# Patient Record
Sex: FEMALE | Race: BLACK OR AFRICAN AMERICAN
[De-identification: names, ages, dates, MRNs, and addresses within clinical notes are randomized per-mention and may not be internally consistent; named-entity substitution may affect disease eponyms.]

---

## 2019-03-20 NOTE — PHYS DOC
Past Medical History


Past Medical History:  Anemia, Arthritis, Bipolar, Depression, Other


Additional Past Medical Histor:  CHRONIC BACK & KNEE PAIN


Past Surgical History:  Other


Additional Past Surgical Histo:  L CARPAL TUNNEL RELEASE


Alcohol Use:  Occasionally


Drug Use:  Phencyclidine





Adult General


Chief Complaint


Chief Complaint:  FLU SYMPTOM





HPI


HPI





Patient is a 43  year old female with history of anemia, arthritis, depression, 

who presents to the ED today with multiple complaints. Patient is complaining 

of body aches, chills, fevers, nausea, vomiting and diarrhea. Symptoms began 3 

days ago. She is actively vomiting in the ED.





Review of Systems


Review of Systems





Constitutional: Reports body aches, chills, fever


Eyes: Denies change in visual acuity, redness, or eye pain []


HENT: Denies nasal congestion or sore throat []


Respiratory: Denies cough or shortness of breath []


Cardiovascular: No additional information not addressed in HPI []


GI: Reports nausea, vomiting, diarrhea. Denies abdominal pain,  bloody stools


: Denies dysuria or hematuria []


Musculoskeletal: Denies back pain or joint pain []


Integument: Denies rash or skin lesions []


Neurologic: Denies headache, focal weakness or sensory changes []








All other systems were reviewed and found to be within normal limits, except as 

documented in this note.





Current Medications


Current Medications





Current Medications








 Medications


  (Trade)  Dose


 Ordered  Sig/Beverly  Start Time


 Stop Time Status Last Admin


Dose Admin


 


 Acetaminophen


  (Tylenol)  1,000 mg  1X  ONCE  3/20/19 15:45


 3/20/19 15:46 DC 3/20/19 16:31


1,000 MG


 


 Ondansetron HCl


  (Zofran)  4 mg  1X  ONCE  3/20/19 15:45


 3/20/19 15:46 DC 3/20/19 16:26


4 MG


 


 Sodium Chloride  1,000 ml @ 


 1,000 mls/hr  1X  ONCE  3/20/19 15:45


 3/20/19 16:44 DC 3/20/19 16:27


1,000 MLS/HR











Allergies


Allergies





Allergies








Coded Allergies Type Severity Reaction Last Updated Verified


 


  Penicillins Allergy Unknown  5/7/16 No











Physical Exam


Physical Exam





Constitutional: Well developed, well nourished, no acute distress, non-toxic 

appearance. []


HENT: Normocephalic, atraumatic, bilateral external ears normal, oropharynx 

moist, no oral exudates, nose normal. []


Eyes: PERRLA, EOMI, conjunctiva normal, no discharge. [] 


Neck: Normal range of motion, no tenderness, supple, no stridor. [] 


Cardiovascular:Heart rate regular rhythm, no murmur []


Lungs & Thorax:  Bilateral breath sounds clear to auscultation []


Abdomen: Patient is actively vomiting in the ED. Bowel sounds normal, soft, no 

tenderness, no masses, no pulsatile masses. [] 


Skin: Warm, dry, no erythema, no rash. [] 


Back: No tenderness, no CVA tenderness. [] 


Extremities: No tenderness, no cyanosis, no clubbing, ROM intact, no edema. [] 


Neurologic: Alert and oriented X 3, normal motor function, normal sensory 

function, no focal deficits noted. []


Psychologic: Flat affect.





Current Patient Data


Vital Signs





 Vital Signs








  Date Time  Temp Pulse Resp B/P (MAP) Pulse Ox O2 Delivery O2 Flow Rate FiO2


 


3/20/19 14:46 98.1 82 16 96/56 (69) 96 Room Air  





 98.1       








Lab Values





 Laboratory Tests








Test


 3/20/19


14:56 3/20/19


15:45 3/20/19


15:48 3/20/19


16:25


 


Influenza Type A Antigen


 Negative


(NEGATIVE) 


 


 





 


Influenza Type B Antigen


 Negative


(NEGATIVE) 


 


 





 


Urine Collection Type  Unknown    


 


Urine Color  Yellow    


 


Urine Clarity  Clear    


 


Urine pH  6.0    


 


Urine Specific Gravity  >=1.030    


 


Urine Protein


 


 30 mg/dL


(NEG-TRACE) 


 





 


Urine Glucose (UA)


 


 Negative mg/dL


(NEG) 


 





 


Urine Ketones (Stick)


 


 Trace mg/dL


(NEG) 


 





 


Urine Blood


 


 Negative (NEG)


 


 





 


Urine Nitrite


 


 Negative (NEG)


 


 





 


Urine Bilirubin  Small (NEG)    


 


Urine Urobilinogen Dipstick


 


 1.0 mg/dL (0.2


mg/dL) 


 





 


Urine Leukocyte Esterase


 


 Negative (NEG)


 


 





 


Urine RBC


 


 3-5 /HPF (0-2)


 


 





 


Urine WBC  0 /HPF (0-4)    


 


Urine Squamous Epithelial


Cells 


 Many /LPF  


 


 





 


Urine Bacteria


 


 Few /HPF


(0-FEW) 


 





 


Urine Mucus  Marked /LPF    


 


Urine Yeast  Present /HPF    


 


POC Urine HCG, Qualitative


 


 


 Hcg negative


(Negative) 





 


White Blood Count


 


 


 


 3.1 x10^3/uL


(4.0-11.0)  L


 


Red Blood Count


 


 


 


 4.71 x10^6/uL


(3.50-5.40)


 


Hemoglobin


 


 


 


 12.8 g/dL


(12.0-15.5)


 


Hematocrit


 


 


 


 39.6 %


(36.0-47.0)


 


Mean Corpuscular Volume


 


 


 


 84 fL ()





 


Mean Corpuscular Hemoglobin    27 pg (25-35)  


 


Mean Corpuscular Hemoglobin


Concent 


 


 


 32 g/dL


(31-37)


 


Red Cell Distribution Width


 


 


 


 16.0 %


(11.5-14.5)  H


 


Platelet Count


 


 


 


 191 x10^3/uL


(140-400)


 


Neutrophils (%) (Auto)    53 % (31-73)  


 


Lymphocytes (%) (Auto)    38 % (24-48)  


 


Monocytes (%) (Auto)    7 % (0-9)  


 


Eosinophils (%) (Auto)    2 % (0-3)  


 


Basophils (%) (Auto)    1 % (0-3)  


 


Neutrophils # (Auto)


 


 


 


 1.6 x10^3uL


(1.8-7.7)  L


 


Lymphocytes # (Auto)


 


 


 


 1.2 x10^3/uL


(1.0-4.8)


 


Monocytes # (Auto)


 


 


 


 0.2 x10^3/uL


(0.0-1.1)


 


Eosinophils # (Auto)


 


 


 


 0.1 x10^3/uL


(0.0-0.7)


 


Basophils # (Auto)


 


 


 


 0.0 x10^3/uL


(0.0-0.2)


 


Sodium Level


 


 


 


 137 mmol/L


(136-145)


 


Potassium Level


 


 


 


 3.7 mmol/L


(3.5-5.1)


 


Chloride Level


 


 


 


 101 mmol/L


()


 


Carbon Dioxide Level


 


 


 


 19 mmol/L


(21-32)  L


 


Anion Gap    17 (6-14)  H


 


Blood Urea Nitrogen


 


 


 


 11 mg/dL


(7-20)


 


Creatinine


 


 


 


 1.0 mg/dL


(0.6-1.0)


 


Estimated GFR


(Cockcroft-Gault) 


 


 


 73.9  





 


BUN/Creatinine Ratio    11 (6-20)  


 


Glucose Level


 


 


 


 88 mg/dL


(70-99)


 


Calcium Level


 


 


 


 8.5 mg/dL


(8.5-10.1)


 


Total Bilirubin


 


 


 


 0.2 mg/dL


(0.2-1.0)


 


Aspartate Amino Transferase


(AST) 


 


 


 43 U/L (15-37)


H


 


Alanine Aminotransferase (ALT)


 


 


 


 27 U/L (14-59)





 


Alkaline Phosphatase


 


 


 


 87 U/L


()


 


Total Protein


 


 


 


 7.7 g/dL


(6.4-8.2)


 


Albumin


 


 


 


 3.7 g/dL


(3.4-5.0)


 


Albumin/Globulin Ratio


 


 


 


 0.9 (1.0-1.7)


L


 


Lipase


 


 


 


 103 U/L


()





 Laboratory Tests


3/20/19 16:25








 Laboratory Tests


3/20/19 16:25











EKG


EKG


[]





Radiology/Procedures


Radiology/Procedures


[]





Course & Med Decision Making


Course & Med Decision Making


Pertinent Labs and Imaging studies reviewed. (See chart for details)





This is a 43-year-old female patient presenting to the ED today with complaints 

of fever, body aches, chills, nausea, vomiting, diarrhea, symptoms began 3 days 

ago. She is actively vomiting in the ED. CBC with a WBC of 3.1, CMP with AST of 

43, patient has no abdominal pain. No tenderness on exam. Negative for 

influenza A or B. Chest x-ray is negative, urine analysis is negative for 

infection. Urine noted for yeast discharged fluconazole. Patient was given a 

liter of fluid, Zofran. She started asking for prescription cough syrup for 

home use. She states over-the-counter medications are not working for her 

cough. Offered her Tessalon Perles or Delsym. She is refusing, she states she 

wants prescription strength cough medications. Informed patient we will not 

write her prescription strength cough medications because over-the-counter 

medications are  sufficient for her symptoms. Provided the PCP list for follow-

up. Discharged with Zofran as well. Instructed to push fluids.





Dragon Disclaimer


Dragon Disclaimer


This electronic medical record was generated, in whole or in part, using a 

voice recognition dictation system.





Departure


Departure


Impression:  


 Primary Impression:  


 Nausea and vomiting


 Additional Impressions:  


 Diarrhea


 Yeast infection


 Cough


Disposition:  01 HOME, SELF-CARE


Condition:  STABLE


Referrals:  


NO PCP (PCP)


follow up in one week


Patient Instructions:  Cough, Adult, Easy-to-Read, Diarrhea, Easy-to-Read, 

Nausea and Vomiting





Additional Instructions:  


You were evaluated in the medicines were noted to have a viral illness. You 

also have yeast in your urine, take the one tablet a fluconazole as ordered. 

Please rest, push fluids. Take over-the-counter medications for cold symptoms. 

Follow-up with your own doctor or doctor from the list provided in 1-2 weeks.


Scripts


Benzonatate (TESSALON PERLE) 100 Mg Capsule


1 CAP PO TID, #30 CAP


   Prov: MUTUNGA,KAMILA MARITZA         3/20/19 


Ondansetron (ONDANSETRON ODT) 4 Mg Tab.rapdis


1 TAB PO PRN Q6-8HRS, #16 TAB


   Prov: KAMILA LEES MARITZA         3/20/19 


Fluconazole (DIFLUCAN) 150 Mg Tablet


1 TAB PO ONCE, #1 TAB 0 Refills


   Prov: ASHLEYKAMILA COBB MARITZA         3/20/19





Problem Qualifiers








 Primary Impression:  


 Nausea and vomiting


 Vomiting type:  unspecified  Vomiting Intractability:  unspecified  Qualified 

Codes:  R11.2 - Nausea with vomiting, unspecified


 Additional Impressions:  


 Diarrhea


 Diarrhea type:  unspecified type  Qualified Codes:  R19.7 - Diarrhea, 

unspecified








KAMILA LEES MARITZA Mar 20, 2019 15:44

## 2019-03-20 NOTE — RAD
AP and Lateral Views of the Chest  3/20/2019 3:37 PM

 

Indication: PT STATES HAVING CHEST PAIN, CONGESTION, FEVFER, COUGH

 

Comparison: 2 views of the chest November 2, 2010

 

Findings: There is no focal consolidation or infiltrate identified. The 

cardiomediastinal silhouette is within normal limits. There is no evidence

of pneumothorax or pleural effusion. No acute osseous abnormalities are 

identified.  

 

Impression: No evidence of acute cardiopulmonary process. 

 

Electronically signed by: Thom Ortega MD (3/20/2019 4:11 PM) [id number]

## 2019-11-05 NOTE — RAD
Two-view chest dated 11/5/2019.

 

Comparison made to 3/20/2019.

 

CLINICAL INDICATION: Cough.

 

FINDINGS:

 

PA and lateral views obtained. Heart and mediastinal contours within 

normal limits. Lungs are hypoinflated but otherwise clear. No 

consolidation or pleural effusion. No pneumothorax.

 

IMPRESSION:

 

No acute radiographic abnormality.

 

Electronically signed by: Kieran Barnes MD (11/5/2019 9:18 PM) 

[facility]

## 2019-11-05 NOTE — PHYS DOC
Past Medical History


Past Medical History:  Anemia, Arthritis, Bipolar, Depression, Other


Additional Past Medical Histor:  CHRONIC BACK & KNEE PAIN


 (KAMILA LEES)


Past Surgical History:  Other


Additional Past Surgical Histo:  L CARPAL TUNNEL RELEASE


 (KAMILA LEES)


Alcohol Use:  Occasionally


Drug Use:  Phencyclidine


 (KAMILA LEES)


Attending Signature


I have participated in the care of this patient and I have reviewed and agree 

with all pertinent clinical information above including history, exam, and 

recommendations.





 (HERNANDEZ CARBAJAL MD)





Adult General


Chief Complaint


Chief Complaint:  COUGH





HPI


HPI





Patient is a 43  year old female who presents with female with a history of PCP 

use, smoking addiction, bipolar, anxiety, who presents to the ED today 

complaining of a cough for a couple days as well as PCP relapse. Patient states 

she had stopped using PCP but relapsed a couple days ago and would like to go to

rehabilitation. Denies any fever, denies any chest pain or shortness of breath.


 (KAMILA LEES)





Review of Systems


Review of Systems





Constitutional: Denies fever or chills []


Eyes: Denies change in visual acuity, redness, or eye pain []


HENT: Denies nasal congestion or sore throat []


Respiratory: Reports cough, denies shortness of breath []


Cardiovascular: No additional information not addressed in HPI []


GI: Denies abdominal pain, nausea, vomiting, bloody stools or diarrhea []


: Denies dysuria or hematuria []


Musculoskeletal: Denies back pain or joint pain []


Integument: Denies rash or skin lesions []


Neurologic: Denies headache, focal weakness or sensory changes []


Psych: Reports PCP relapse





All other systems were reviewed and found to be within normal limits, except as 

documented in this note.


 (KAMILA LEES)





Allergies


Allergies





Allergies








Coded Allergies Type Severity Reaction Last Updated Verified


 


  Penicillins Allergy Unknown  5/7/16 No





 (HERNANDEZ CARBAJAL MD)





Physical Exam


Physical Exam





Constitutional: Well developed, well nourished, no acute distress, non-toxic 

appearance. []


HENT: Normocephalic, atraumatic, bilateral external ears normal, oropharynx 

moist, no oral exudates, nose normal. []


Eyes: PERRLA, EOMI, conjunctiva normal, no discharge. [] 


Neck: Normal range of motion, no tenderness, supple, no stridor. [] 


Cardiovascular:Heart rate regular rhythm, no murmur []


Lungs & Thorax:  Bilateral breath sounds clear to auscultation []


Abdomen: Bowel sounds normal, soft, no tenderness, no masses, no pulsatile 

masses. [] 


Skin: Warm, dry, no erythema, no rash. [] 


Back: No tenderness, no CVA tenderness. [] 


Extremities: No tenderness, no cyanosis, no clubbing, ROM intact, no edema. [] 


Neurologic: Alert and oriented X 3, normal motor function, normal sensory 

function, no focal deficits noted. Cranial nerves II through XII intact


Psychologic: Affect normal, judgement normal, mood normal. []


 (KAMILA LEES)





Current Patient Data


Vital Signs





                                   Vital Signs








  Date Time  Temp Pulse Resp B/P (MAP) Pulse Ox O2 Delivery O2 Flow Rate FiO2


 


11/5/19 20:10 97.8 81 16 131/84 (100) 98 Room Air  





 97.8       





 (HERNANDEZ CARBAJAL MD)





EKG


EKG


[]


 (KAMILA LEES)





Radiology/Procedures


Radiology/Procedures


[]PROCEDURE: CHEST PA & LATERAL





Two-view chest dated 11/5/2019.


 


Comparison made to 3/20/2019.


 


CLINICAL INDICATION: Cough.


 


FINDINGS:


 


PA and lateral views obtained. Heart and mediastinal contours within 


normal limits. Lungs are hypoinflated but otherwise clear. No 


consolidation or pleural effusion. No pneumothorax.


 


IMPRESSION:


 


No acute radiographic abnormality.


 


Electronically signed by: Kieran Barnes MD (11/5/2019 9:18 PM) 


[facility]














DICTATED and SIGNED BY:     KIERAN BARNES MD


DATE:     11/05/19 2118


 (KAMILA LEES)





Course & Med Decision Making


Course & Med Decision Making


Pertinent Labs and Imaging studies reviewed. (See chart for details)





This is a 43-year-old female patient presenting to the ED today with a cough for

 couple days, chest x-ray is negative.





Patient is also requesting to go to rehabilitation after PCP relapse





Rehan from the parts team talked to patient. He arranged for her to go to rehab 

through [facility] tomorrow. D/c to home








 (KAMILA LEES)





Dragon Disclaimer


Dragon Disclaimer


This electronic medical record was generated, in whole or in part, using a voice

 recognition dictation system.


 (KAMILA LEES)





Departure


Departure


Impression:  


   Primary Impression:  


   PCP (phencyclidine) abuse


   Additional Impression:  


   Cough


Disposition:  01 HOME, SELF-CARE


Condition:  STABLE


Referrals:  


UNKNOWN PCP NAME (PCP)


follow up with Jack tomorrow for rehab


Patient Instructions:  Cough, Adult, Easy-to-Read, Drug Abuse and Addiction-

[facility]





Additional Instructions:  


You were evaluated in the emergency room, your chest x-ray is negative for any 

acute findings. Take the prescribed medications as needed for your cough. Please

 follow-up with Jack for PCP use.


Scripts


Dicyclomine Hcl (DICYCLOMINE HCL) 20 Mg Tablet


1 TAB PO TID, #30 TAB 1 Refill


   Prov: KAMILA LEES         11/5/19 


Benzonatate (TESSALON PERLE) 100 Mg Capsule


1 CAP PO TID, #21 CAP


   Prov: KAMILA LEES         11/5/19 


Ondansetron (ONDANSETRON ODT) 4 Mg Tab.rapdis


1 TAB PO PRN Q6-8HRS, #16 TAB


   Prov: KAMILA LEES         11/5/19





Problem Qualifiers











KAMILA LEES               Nov 5, 2019 22:17


HERNANDEZ CARBAJAL MD               Nov 6, 2019 00:34

## 2020-06-18 NOTE — PHYS DOC
Past Medical History


Past Medical History:  Anemia, Arthritis, Bipolar, Depression, Other


Additional Past Medical Histor:  CHRONIC BACK & KNEE PAIN


Past Surgical History:  Other


Additional Past Surgical Histo:  L CARPAL TUNNEL RELEASE


Smoking Status:  Current Every Day Smoker


Alcohol Use:  Occasionally


Drug Use:  Phencyclidine


Social History Narrative:  PCP USE





General Adult


EDM:


Chief Complaint:  SEXUALLY TRANSMITTED DISEASE





HPI:


HPI:





Patient is a 44  year old female who presents for evaluation of vaginal 

discharge and discomfort that is been progressing in the past 3 to 4 days.  Last

menstrual period was about 3 weeks ago.  Patient is a  4 para 4.  Patient

is nontoxic-appearing and patient is concerned about exposure to STD





Review of Systems:


Review of Systems:


Constitutional:  Denies fever or chills. []


Eyes:  Denies change in visual acuity. []


HENT:  Denies nasal congestion or sore throat. [] 


Respiratory:  Denies cough or shortness of breath. [] 


Cardiovascular:  Denies chest pain or edema. [] 


GI:  Denies abdominal pain, nausea, vomiting, bloody stools or diarrhea. [] 


:  Denies dysuria, vaginal discharge. [] 


Musculoskeletal:  Denies back pain or joint pain. [] 


Integument:  Denies rash. [] 


Neurologic:  Denies headache, focal weakness or sensory changes. [] 


Endocrine:  Denies polyuria or polydipsia. [] 


Lymphatic:  Denies swollen glands. [] 


Psychiatric:  Denies depression or anxiety. []





Heart Score:


Risk Factors:


Risk Factors:  DM, Current or recent (<one month) smoker, HTN, HLP, family 

history of CAD, obesity.


Risk Scores:


Score 0 - 3:  2.5% MACE over next 6 weeks - Discharge Home


Score 4 - 6:  20.3% MACE over next 6 weeks - Admit for Clinical Observation


Score 7 - 10:  72.7% MACE over next 6 weeks - Early Invasive Strategies





Current Medications:





Current Medications








 Medications


  (Trade)  Dose


 Ordered  Sig/Beverly  Start Time


 Stop Time Status Last Admin


Dose Admin


 


 Azithromycin


  (Zithromax)  1,000 mg  1X  ONCE  20 23:30


 20 23:31 DC 20 23:23


1,000 MG


 


 Ceftriaxone Sodium


  (Rocephin Im)  250 mg  1X  ONCE  20 23:30


 20 23:31 DC 6/18/20 23:23


250 MG











Allergies:


Allergies:





Allergies








Coded Allergies Type Severity Reaction Last Updated Verified


 


  Penicillins Allergy Intermediate  20 No











Physical Exam:


PE:





Constitutional: Well developed, well nourished, mild acute distress, non-toxic 

appearance. []


HENT: Normocephalic, atraumatic, bilateral external ears normal, oropharynx 

moist, no oral exudates, nose normal. []


Eyes: PERRL, EOMI, conjunctiva normal, no discharge. [] 


Neck: Normal range of motion, no tenderness, supple. [] 


Cardiovascular:Heart rate regular rhythm, no murmur []


Lungs & Thorax:  Bilateral breath sounds clear to auscultation []


Abdomen: Bowel sounds normal, soft, no tenderness. [] 


Skin: Warm, dry, no erythema, no rash. [] 


Back: No tenderness, no CVA tenderness. [] 


Extremities: No tenderness, no cyanosis, ROM intact, no edema. [] 


Neurologic: Alert and oriented X 3, normal motor function, normal sensory 

function, no focal deficits noted. []


Psychologic: Affect normal, judgement normal, mood normal. 


: Nursing chaperone present, swabs obtained, scant discharge present, no 

evidence of PID, no significant uterine or adnexal tenderness, no bleeding []





Current Patient Data:


Labs:





                                Laboratory Tests








Test


 20


21:24 20


22:10 20


22:25


 


Urine Collection Type Unknown    


 


Urine Color Straw    


 


Urine Clarity Clear    


 


Urine pH


 6.5 (<5.0-8.0)


 


 





 


Urine Specific Gravity


 <=1.005


(1.000-1.030) 


 





 


Urine Protein


 Negative mg/dL


(NEG-TRACE) 


 





 


Urine Glucose (UA)


 Negative mg/dL


(NEG) 


 





 


Urine Ketones (Stick)


 Negative mg/dL


(NEG) 


 





 


Urine Blood


 Negative (NEG)


 


 





 


Urine Nitrite


 Negative (NEG)


 


 





 


Urine Bilirubin


 Negative (NEG)


 


 





 


Urine Urobilinogen Dipstick


 0.2 mg/dL (0.2


mg/dL) 


 





 


Urine Leukocyte Esterase


 Negative (NEG)


 


 





 


Urine RBC 0 /HPF (0-2)    


 


Urine WBC 0 /HPF (0-4)    


 


Urine Squamous Epithelial


Cells Mod /LPF  


 


 





 


Urine Bacteria


 Mod /HPF


(0-FEW) 


 





 


White Blood Count


 


 9.1 x10^3/uL


(4.0-11.0) 





 


Red Blood Count


 


 4.07 x10^6/uL


(3.50-5.40) 





 


Hemoglobin


 


 11.0 g/dL


(12.0-15.5)  L 





 


Hematocrit


 


 32.8 %


(36.0-47.0)  L 





 


Mean Corpuscular Volume


 


 81 fL ()


 





 


Mean Corpuscular Hemoglobin  27 pg (25-35)   


 


Mean Corpuscular Hemoglobin


Concent 


 34 g/dL


(31-37) 





 


Red Cell Distribution Width


 


 17.0 %


(11.5-14.5)  H 





 


Platelet Count


 


 302 x10^3/uL


(140-400) 





 


Neutrophils (%) (Auto)  50 % (31-73)   


 


Lymphocytes (%) (Auto)  38 % (24-48)   


 


Monocytes (%) (Auto)  7 % (0-9)   


 


Eosinophils (%) (Auto)  4 % (0-3)  H 


 


Basophils (%) (Auto)  1 % (0-3)   


 


Neutrophils # (Auto)


 


 4.6 x10^3/uL


(1.8-7.7) 





 


Lymphocytes # (Auto)


 


 3.5 x10^3/uL


(1.0-4.8) 





 


Monocytes # (Auto)


 


 0.6 x10^3/uL


(0.0-1.1) 





 


Eosinophils # (Auto)


 


 0.3 x10^3/uL


(0.0-0.7) 





 


Basophils # (Auto)


 


 0.1 x10^3/uL


(0.0-0.2) 





 


Sodium Level


 


 139 mmol/L


(136-145) 





 


Potassium Level


 


 4.0 mmol/L


(3.5-5.1) 





 


Chloride Level


 


 102 mmol/L


() 





 


Carbon Dioxide Level


 


 25 mmol/L


(21-32) 





 


Anion Gap  12 (6-14)   


 


Blood Urea Nitrogen


 


 11 mg/dL


(7-20) 





 


Creatinine


 


 1.0 mg/dL


(0.6-1.0) 





 


Estimated GFR


(Cockcroft-Gault) 


 73.6  


 





 


BUN/Creatinine Ratio  11 (6-20)   


 


Glucose Level


 


 90 mg/dL


(70-99) 





 


Calcium Level


 


 9.0 mg/dL


(8.5-10.1) 





 


Total Bilirubin


 


 0.2 mg/dL


(0.2-1.0) 





 


Aspartate Amino Transferase


(AST) 


 22 U/L (15-37)


 





 


Alanine Aminotransferase (ALT)


 


 29 U/L (14-59)


 





 


Alkaline Phosphatase


 


 105 U/L


() 





 


Total Protein


 


 7.4 g/dL


(6.4-8.2) 





 


Albumin


 


 3.6 g/dL


(3.4-5.0) 





 


Albumin/Globulin Ratio


 


 0.9 (1.0-1.7)


L 





 


POC Urine HCG, Qualitative


 


 


 Hcg negative


(Negative)





                                Laboratory Tests


20 22:10








                                Laboratory Tests


20 22:10











Microbiology


20 Wet Prep - Final, Complete


Vital Signs:





                                   Vital Signs








  Date Time  Temp Pulse Resp B/P (MAP) Pulse Ox O2 Delivery O2 Flow Rate FiO2


 


20 21:37 96.5 104 14 132/88 (103) 99 Room Air  





 96.5       











EKG:


EKG:


[]





Radiology/Procedures:


Radiology/Procedures:


[]





Course & Med Decision Making:


Course & Med Decision Making


Pertinent Labs and Imaging studies reviewed. (See chart for details)





[]





Dragon Disclaimer:


Dragon Disclaimer:


This electronic medical record was generated, in whole or in part, using a voice

 recognition dictation system.





2355 stable, urinalysis and blood work unremarkable.  Patient treated for 

vaginitis with Rocephin 250 mg IM and Zithromax 1 g p.o.  Close follow-up with 

her family doctor recommended.  Nothing to suggest PID at this time





Departure


Departure


Impression:  


   Primary Impression:  


   Vaginitis


Disposition:  01 HOME, SELF-CARE


Condition:  STABLE


Referrals:  


NO PCP (PCP)








MARELY VILLANUEVA MD


Patient Instructions:  Vaginitis, Easy-to-Read





Additional Instructions:  


Pelvic rest, no intercourse until symptoms improve over several days.  Call and 

see your doctor right away in follow-up, your blood work and urinalysis were 

essentially normal





Justicifation of Admission Dx:


Justifications for Admission:


Justification of Admission Dx:  N/A











YENY RODRIGUEZ DO              2020 23:53

## 2020-08-11 NOTE — PHYS DOC
Past Medical History


Past Medical History:  Anemia, Arthritis, Bipolar, Depression, Other


Additional Past Medical Histor:  CHRONIC BACK & KNEE PAIN


Past Surgical History:  Other


Additional Past Surgical Histo:  L CARPAL TUNNEL RELEASE


Smoking Status:  Current Every Day Smoker


Alcohol Use:  Occasionally


Drug Use:  Phencyclidine





General Adult


EDM:


Chief Complaint:  BACK PAIN OR INJURY





HPI:


HPI:





Patient is a 44  year old female who presents with chronic back pain and knee 

pain.  She has bulging disks in her lower back she states.  She states for the 

last month or so it is gotten worse and she is having bilateral low back pain 

that sharp and shooting and goes down her legs.  The times will cause tingling 

in her feet.  She states she had a doctor's appointment with her primary care 

yesterday but she missed that and states she did call them and told him why she 

was missing it.  She states that she took a couple of pills from her neighbor of

which she does not know what they are yesterday to see if that would help her 

pain.  I did educate the patient that she should not take other peoples 

medications that are not prescribed to her.  Patient became very upset and 

states "well im in pain and it im in pain im going to take whatever i can to 

help that pain".  She states that she does have a another doctor's appointment 

scheduled for the 28th.  She states when she stands up for long periods of time 

her whole back will get tense and start to cramp.  She states she has been 

taking 600 mg of ibuprofen at a time and taking Tylenol it is not helping.  She 

states that she also tries hot baths to help.  Patient rates her sharp shooting 

radiating pain a 10 out of 10.





Review of Systems:


Review of Systems:


Constitutional:   Denies fever or chills. []


Eyes:   Denies change in visual acuity. []


HENT:   Denies nasal congestion or sore throat. [] 


Respiratory:   Denies cough or shortness of breath. [] 


Cardiovascular:   Denies chest pain or edema. [] 


GI:   Denies abdominal pain, nausea, vomiting, bloody stools or diarrhea. [] 


:  Denies dysuria. [] 


Musculoskeletal: Bilateral low back pain that is sharp and shooting down the 

back of her legs bilaterally or joint pain. [] 


Integument:   Denies rash. [] 


Neurologic:   Denies headache, focal weakness or sensory changes. [] 


Endocrine:   Denies polyuria or polydipsia. [] 


Lymphatic:  Denies swollen glands. [] 


Psychiatric:  Denies depression or anxiety. []





Heart Score:


Risk Factors:


Risk Factors:  DM, Current or recent (<one month) smoker, HTN, HLP, family 

history of CAD, obesity.


Risk Scores:


Score 0 - 3:  2.5% MACE over next 6 weeks - Discharge Home


Score 4 - 6:  20.3% MACE over next 6 weeks - Admit for Clinical Observation


Score 7 - 10:  72.7% MACE over next 6 weeks - Early Invasive Strategies





Allergies:


Allergies:





Allergies








Coded Allergies Type Severity Reaction Last Updated Verified


 


  Penicillins Allergy Intermediate  6/18/20 No











Physical Exam:


PE:





Constitutional: Well developed, well nourished, no acute distress, non-toxic 

appearance. []


HENT: Normocephalic, atraumatic, bilateral external ears normal, oropharynx 

moist, no oral exudates, nose normal. []


Eyes: PERRLA, EOMI, conjunctiva normal, no discharge. [] 


Neck: Normal range of motion, no tenderness, supple, no stridor. [] 


Cardiovascular:Heart rate regular rhythm, no murmur []


Lungs & Thorax:  Bilateral breath sounds clear to auscultation []


Abdomen: Bowel sounds normal, soft, no tenderness, no masses, no pulsatile 

masses. [] 


Skin: Warm, dry, no erythema, no rash. [] 


Back: Lateral low back tenderness, no CVA tenderness. [] 


Extremities: No tenderness, no cyanosis, no clubbing, ROM intact, no edema. [] 


Neurologic: Alert and oriented X 3, normal motor function, normal sensory 

function, no focal deficits noted. []


Psychologic: Affect normal, judgement normal, mood normal. []





Current Patient Data:


Vital Signs:





                                   Vital Signs








  Date Time  Temp Pulse Resp B/P (MAP) Pulse Ox O2 Delivery O2 Flow Rate FiO2


 


8/11/20 14:17 98.2 103 20 135/61 (85) 98 Room Air  





 98.2       











EKG:


EKG:


[]





Radiology/Procedures:


Radiology/Procedures:


[]





Course & Med Decision Making:


Course & Med Decision Making


Pertinent Labs and Imaging studies reviewed. (See chart for details)





Alert and oriented x4.  Ambulatory with a steady gait.  No unilateral extremity 

swelling.  Patient has no focal deficits and moves all extremities with normal 

strength and full range of motion's.  Bilateral lower back tenderness with 

palpation.  Denies any urinary symptoms, chest pain, shortness of air, 

dizziness, headache, abdominal pain, nausea, vomiting, diarrhea, fever, vision 

changes, loss of bowel or bladder.  No saddle paresthesias.  Patient states 

sitting and standing and walking make the pain worse.  She states when she 

sitting still the pain is tolerable.  Skin pink warm and dry.  Patient is 

discharged home and to follow-up with her primary care physician as scheduled.





[]





Dragon Disclaimer:


Dragon Disclaimer:


This electronic medical record was generated, in whole or in part, using a voice

 recognition dictation system.





Departure


Departure


Impression:  


   Primary Impression:  


   Chronic low back pain with bilateral sciatica


   Qualified Codes:  M54.42 - Lumbago with sciatica, left side; M54.41 - Lumbago

    with sciatica, right side; G89.29 - Other chronic pain


Disposition:  01 HOME, SELF-CARE


Condition:  STABLE


Referrals:  


UNKNOWN PCP NAME (PCP)


Patient Instructions:  Sciatica with [facility]





Additional Instructions:  


Take medication as prescribed and with food.  Follow-up with your doctor as 

scheduled.  Number these medications will make you sleepy so do not drive or 

drink alcohol with these medications.


Scripts


Orphenadrine Citrate (ORPHENADRINE CITRATE) 100 Mg Tablet.er


1 TAB PO BID, #14 TAB


   Prov: ONEIDA TOMLINSON APRN 8/11/20 


Hydrocodone/Apap 5-325 (NORCO 5-325 TABLET) 1 Each Tablet


1 TAB PO PRN Q6HRS PRN for PAIN, #10 TAB 0 Refills


   Prov: ONEIDA TOMLINSON         8/11/20 


Methylprednisolone (MEDROL) 4 Mg Tab.ds.pk


1 PKG PO UD, #1 PKG


   Prov: ONEIDA TOMLINSON         8/11/20





Justicifation of Admission Dx:


Justifications for Admission:


Justification of Admission Dx:  N/A











ONEIDA TOMLINSON            Aug 11, 2020 14:44

## 2020-10-03 NOTE — PHYS DOC
Past Medical History


Past Medical History:  Anemia, Arthritis, Bipolar, Depression, Other


Additional Past Medical Histor:  CHRONIC BACK & KNEE PAIN


Past Surgical History:  Other


Additional Past Surgical Histo:  L CARPAL TUNNEL RELEASE


Smoking Status:  Current Every Day Smoker


Additional Information:  


1/2 ppd


Alcohol Use:  Occasionally


Drug Use:  Phencyclidine





General Adult


EDM:


Chief Complaint:  SEXUALLY TRANSMITTED DISEASE





HPI:


HPI:





Patient is a 44-year-old female who presents with a chief complaint of vaginal 

discharge over the last 2 to 3 days.  Patient has some itching malodorous 

discharge as well as some lower abdominal pain.  Patient has any fevers vomiting

or diarrhea.  Patient also complains of chronic diffuse pain especially in her 

back.  Patient asking for pain medicines before she can get into see a pain 

management doctor.  Patient admits to unprotected sexual intercourse





Review of Systems:


Review of Systems:


Constitutional:   Denies fever or chills. []


Eyes:   Denies change in visual acuity. []


HENT:   Denies nasal congestion or sore throat. [] 


Respiratory:   Denies cough or shortness of breath. [] 


Cardiovascular:   Denies chest pain or edema. [] 


GI: Complains of abdominal pain


: Complains of vaginal discharge


Musculoskeletal:   Denies back pain or joint pain. [] 


Integument:   Denies rash. [] 


Neurologic:   Denies headache, focal weakness or sensory changes. [] 


Endocrine:   Denies polyuria or polydipsia. [] 


Lymphatic:  Denies swollen glands. [] 


Psychiatric:  Denies depression or anxiety. []





Heart Score:


Risk Factors:


Risk Factors:  DM, Current or recent (<one month) smoker, HTN, HLP, family 

history of CAD, obesity.


Risk Scores:


Score 0 - 3:  2.5% MACE over next 6 weeks - Discharge Home


Score 4 - 6:  20.3% MACE over next 6 weeks - Admit for Clinical Observation


Score 7 - 10:  72.7% MACE over next 6 weeks - Early Invasive Strategies





Current Medications:





Current Medications








 Medications


  (Trade)  Dose


 Ordered  Sig/Beverly  Start Time


 Stop Time Status Last Admin


Dose Admin


 


 Azithromycin


  (Zithromax)  1,000 mg  1X  ONCE  10/3/20 02:30


 10/3/20 02:31 UNV  





 


 Ceftriaxone Sodium


  (Rocephin Im)  250 mg  1X  ONCE  10/3/20 02:30


 10/3/20 02:31 UNV  





 


 Ondansetron HCl


  (Zofran Odt)  4 mg  1X  ONCE  10/3/20 02:30


 10/3/20 02:31 UNV  














Allergies:


Allergies:





Allergies








Coded Allergies Type Severity Reaction Last Updated Verified


 


  Penicillins Allergy Intermediate  10/2/20 No











Physical Exam:


PE:





Constitutional: Well developed, well nourished, no acute distress, non-toxic 

appearance. []


HENT: Normocephalic, atraumatic, bilateral external ears normal, no trismus nose

 normal. []


Eyes: PERRLA, EOMI, conjunctiva normal, no discharge. [] 


Neck: Normal range of motion, no tenderness, supple, no stridor. [] 


Cardiovascular:Heart rate regular rhythm peripheral pulses are intact cap refill

 is brisk


Lungs & Thorax:  Bilateral breath sounds clear, no respiratory distress


Abdomen: Abdomen soft mild lower abdominal tenderness without guarding or 

rebound no pulsatile masses


 exam: Chaperone present, mild scant white discharge.  No cervical motion 

tenderness.


Skin: Warm, dry, no erythema, no rash. [] 


Back: No tenderness, no CVA tenderness. [] 


Extremities: No tenderness, no cyanosis, no clubbing, ROM intact, no edema. [] 


Neurologic: Alert and oriented X 3, normal motor function, normal sensory 

function, no focal deficits noted. []


Psychologic: Affect normal, judgement normal, mood normal. []





Current Patient Data:


Labs:





                                Laboratory Tests








Test


 10/3/20


01:38 10/3/20


01:40


 


POC Urine HCG, Qualitative


 Hcg negative


(Negative) 





 


Urine Collection Type  Unknown  


 


Urine Color  Yellow  


 


Urine Clarity  Clear  


 


Urine pH


 


 6.5 (<5.0-8.0)





 


Urine Specific Gravity


 


 <=1.005


(1.000-1.030)


 


Urine Protein


 


 Negative mg/dL


(NEG-TRACE)


 


Urine Glucose (UA)


 


 Negative mg/dL


(NEG)


 


Urine Ketones (Stick)


 


 Negative mg/dL


(NEG)


 


Urine Blood


 


 Negative (NEG)





 


Urine Nitrite


 


 Negative (NEG)





 


Urine Bilirubin


 


 Negative (NEG)





 


Urine Urobilinogen Dipstick


 


 0.2 mg/dL (0.2


mg/dL)


 


Urine Leukocyte Esterase


 


 Negative (NEG)





 


Urine RBC


 


 Occ /HPF (0-2)





 


Urine WBC


 


 Occ /HPF (0-4)





 


Urine Squamous Epithelial


Cells 


 Few /LPF  





 


Urine Bacteria


 


 Few /HPF


(0-FEW)








Vital Signs:





                                   Vital Signs








  Date Time  Temp Pulse Resp B/P (MAP) Pulse Ox O2 Delivery O2 Flow Rate FiO2


 


10/2/20 22:20 98.1 109 20 140/93 (109) 100 Room Air  





 98.1       











EKG:


EKG:


[]





Radiology/Procedures:


Radiology/Procedures:


[]





Course & Med Decision Making:


Course & Med Decision Making


Pertinent Labs and Imaging studies reviewed. (See chart for details)





[] 44-year-old female presents with vaginal discharge.  Patient was treated for 

GC and chlamydia and her wet mount shows bacterial vaginosis so we will add 

Flagyl.  Abdominal exam is nonsurgical.  Patient is clinically stable.





Dragon Disclaimer:


Dragon Disclaimer:


This electronic medical record was generated, in whole or in part, using a voice

 recognition dictation system.





Departure


Departure


Impression:  


   Primary Impression:  


   Bacterial vaginosis


Disposition:  01 HOME, SELF-CARE


Condition:  STABLE


Referrals:  


UNKNOWN PCP NAME (PCP)


2-3 DAYS


Patient Instructions:  Bacterial Vaginosis





Additional Instructions:  


EMERGENCY DEPARTMENT GENERAL DISCHARGE INSTRUCTIONS





THANK YOU for coming to [facility] (ED) 

today and 


trusting us with your care.  We trust that you had a positive experience in our 

Emergency 


Department. If you wish to speak to the department Management you can contact 

the department 


Director at [phone].








YOUR FOLLOW UP INSTRUCTIONS ARE AS FOLLOWS: 





Do you have a private doctor? If you do not have a private doctor, please ask 

for a resource 


list of physicians or clinics that may be able to assist you with follow up 

care.  





The Emergency Physician has interpreted your x-rays. The X-ray specialist will 

also review 


them. If there is a change in the findings you will be notified in 48 hours when

 at all 


possible. 





A lab test or lab culture may have been done, your results will be reviewed and 

you will be 


notified if you need a change in treatment. 








ADDITIONAL INSTRUCTIONS AND INFORMATION 





Your care today has been supervised by a physician who is specially trained in 

emergency 


care. Many problems require more than one evaluation for a complete diagnosis 

and treatment. 


We recommend that you schedule your follow up appointment as recommended to 

ensure complete 


treatment of your illness or injury.  If you are unable to obtain follow up care

 and 


continue to have a problem, or if your condition worsens we recommend that you 

return to the 


ED. 





We are not able to safely determine your condition over the phone nor are we 

able to give 


sound medical advice over the phone. For these safety reasons, if you call for 

medical 


advice we will ask you to come to the ED for further evaluation





If you have any questions regarding these discharge instructions please call the

 ED at [phone].





SAFETY INFORMATION





In the interest of safety, wellness, and injury prevention; we encourage you to 

wear your 


seatbelt, if you smoke; quit smoking, and we encourage your family to use 

protective helmet 


for bicycling and other sporting events that present an increased risk for head 

injury. 





IF YOUR SYMPTOMS WORSEN OR NEW SYMPTOMS DEVELOP, OR YOU HAVE CONCERNS ABOUT YOUR

 CONDITION; 


OR IF YOUR CONDITION WORSENS WHILE YOU ARE WAITING FOR YOUR FOLLOW UP 

APPOINTMENT;  EITHER  


CONTACT YOUR PRIMARY CARE DOCTOR, THE PHYSICIAN WHOSE NAME AND NUMBER YOU WERE 

GIVEN,  OR 


RETURN TO THE  ED IMMEDIATELY.


Scripts


Metronidazole (FLAGYL) 500 Mg Tablet


1 TAB PO BID, #14 TAB


   Prov: TWYLA BENEDICT MD         10/3/20











TWYLA BENEDICT MD               Oct 3, 2020 02:24

## 2020-11-23 NOTE — PHYS DOC
Past Medical History


Past Medical History:  Anemia, Arthritis, Bipolar, Depression, Other


Additional Past Medical Histor:  CHRONIC BACK & KNEE PAIN


Past Surgical History:  Other


Additional Past Surgical Histo:  L CARPAL TUNNEL RELEASE


Smoking Status:  Current Every Day Smoker


Alcohol Use:  Occasionally


Drug Use:  Phencyclidine





General Adult


EDM:


Chief Complaint:  MULTIPLE COMPLAINTS





HPI:


HPI:





Patient is a 44  year old female with history of depression, anxiety, bipolar, 

who presents to the ED today complaining of a sore in her left nose for 3 weeks.

 Patient denies any fever, coughing, nasal congestion. She states she has used 

Neosporin to the sore with no relief.  She also states she had unprotected sex 

and was concerned about STDs and would like to be treated.





Review of Systems:


Review of Systems:





HENT:   Reports sore in the left nose.  Denies nasal congestion or sore throat. 

[] 


Respiratory:   Denies cough or shortness of breath. [] 


Cardiovascular:   Denies chest pain or edema. [] 


GI:   Denies abdominal pain, nausea, vomiting, bloody stools or diarrhea. [] 


: Concern for STDs.  Denies dysuria. [] 


Musculoskeletal:   Denies back pain or joint pain. [] 


Integument:   Denies rash. [] 


Neurologic:   Denies headache, focal weakness or sensory changes. [] 


Psychiatric:  Denies depression or anxiety. []





Heart Score:


Risk Factors:


Risk Factors:  DM, Current or recent (<one month) smoker, HTN, HLP, family 

history of CAD, obesity.


Risk Scores:


Score 0 - 3:  2.5% MACE over next 6 weeks - Discharge Home


Score 4 - 6:  20.3% MACE over next 6 weeks - Admit for Clinical Observation


Score 7 - 10:  72.7% MACE over next 6 weeks - Early Invasive Strategies





Current Medications:





Current Medications








 Medications


  (Trade)  Dose


 Ordered  Sig/Beverly  Start Time


 Stop Time Status Last Admin


Dose Admin


 


 Azithromycin


  (Zithromax)  1,000 mg  1X  ONCE  11/23/20 12:30


 11/23/20 12:31 DC 11/23/20 13:02


1,000 MG


 


 Ceftriaxone Sodium


  (Rocephin Im)  250 mg  1X  ONCE  11/23/20 12:30


 11/23/20 12:31 DC 11/23/20 13:02


250 MG


 


 Metronidazole


  (Flagyl)  2,000 mg  1X  ONCE  11/23/20 12:30


 11/23/20 12:31 DC 11/23/20 13:02


2,000 MG











Allergies:


Allergies:





Allergies








Coded Allergies Type Severity Reaction Last Updated Verified


 


  Penicillins Allergy Intermediate  10/2/20 No











Physical Exam:


PE:





Constitutional: Well developed, well nourished, no acute distress, non-toxic 

appearance. []


HENT: Normocephalic, atraumatic, bilateral external ears normal, oropharynx 

moist, no oral exudates,


Left upper proximal nasal cavity with an area of erythema.  No drainage.


Eyes: PERRLA, EOMI, conjunctiva normal, no discharge. [] 


Neck: Normal range of motion, no tenderness, supple, no stridor. [] 


Cardiovascular:Heart rate regular rhythm, no murmur []


Lungs & Thorax:  Bilateral breath sounds clear to auscultation []


Abdomen: Bowel sounds normal, soft, no tenderness, no masses, no pulsatile 

masses. [] 


Skin: Warm, dry, no erythema, no rash. [] 


Back: No tenderness, no CVA tenderness. [] 


Extremities: No tenderness, no cyanosis, no clubbing, ROM intact, no edema. [] 


Neurologic: Alert and oriented X 3, normal motor function, normal sensory 

function, no focal deficits noted. []


Psychologic: Affect normal, judgement normal, mood normal. []





Current Patient Data:


Labs:





                                Laboratory Tests








Test


 11/23/20


12:16


 


Urine Collection Type Unknown  


 


Urine Color Yellow  


 


Urine Clarity Clear  


 


Urine pH


 6.5 (<5.0-8.0)





 


Urine Specific Gravity


 1.020


(1.000-1.030)


 


Urine Protein


 Negative mg/dL


(NEG-TRACE)


 


Urine Glucose (UA)


 Negative mg/dL


(NEG)


 


Urine Ketones (Stick)


 Negative mg/dL


(NEG)


 


Urine Blood


 Negative (NEG)





 


Urine Nitrite


 Negative (NEG)





 


Urine Bilirubin


 Negative (NEG)





 


Urine Urobilinogen Dipstick


 1.0 mg/dL (0.2


mg/dL)


 


Urine Leukocyte Esterase


 Negative (NEG)





 


Urine RBC


 1-2 /HPF (0-2)





 


Urine WBC


 1-4 /HPF (0-4)





 


Urine Squamous Epithelial


Cells Many /LPF  





 


Urine Bacteria


 Few /HPF


(0-FEW)


 


Urine Mucus Slight /LPF  


 


Urine Opiates Screen Neg (NEG)  


 


Urine Methadone Screen Neg (NEG)  


 


Urine Barbiturates Neg (NEG)  


 


Urine Phencyclidine Screen Pos (NEG)  


 


Urine


Amphetamine/Methamphetamine Neg (NEG)  





 


Urine Benzodiazepines Screen Neg (NEG)  


 


Urine Cocaine Screen Neg (NEG)  


 


Urine Cannabinoids Screen Pos (NEG)  


 


Urine Ethyl Alcohol Neg (NEG)  








Vital Signs:





                                   Vital Signs








  Date Time  Temp Pulse Resp B/P (MAP) Pulse Ox O2 Delivery O2 Flow Rate FiO2


 


11/23/20 12:06 98.7 85 18 142/66 (91) 98 Room Air  





 98.7       











EKG:


EKG:


[]





Radiology/Procedures:


Radiology/Procedures:


[]





Course & Med Decision Making:


Course & Med Decision Making


Pertinent Labs and Imaging studies reviewed. (See chart for details)





This is a 44-year-old female patient presenting to the ED today with a sore in 

the left nasal cavity, tetanus up-to-date.  Discharged with Bactroban.  Also 

concerned about STDs.  Was treated in the ED.  Provided return precautions.





Dragon Disclaimer:


Dragon Disclaimer:


This electronic medical record was generated, in whole or in part, using a voice

 recognition dictation system.





Departure


Departure


Impression:  


   Primary Impression:  


   Nasal sore


   Additional Impression:  


   Concern about STD in female without diagnosis


Disposition:  01 DC HOME SELF CARE/HOMELESS


Condition:  STABLE


Referrals:  


UNKNOWN PCP NAME (PCP)


Follow-up with your doctor in 1 to 2 weeks


Patient Instructions:  Sexually Transmitted Disease





Additional Instructions:  


Please go to your pharmacy and  the prescription for mupirocin and use it

 as directed.  We treated you for sexually transmitted diseases.  Use protection

 at all times.


Scripts


Mupirocin (MUPIROCIN OINTMENT) 22 Gm Oint...g.


1 MALKA TP TID for apply to the left nasal cavity, #1 TUBE


   Prov: KAMILA LEES         11/23/20











KAMILA LEES              Nov 23, 2020 13:36

## 2020-12-01 NOTE — PHYS DOC
Past Medical History


Past Medical History:  Anemia, Arthritis, Bipolar, Depression, Other


Additional Past Medical Histor:  CHRONIC BACK & KNEE PAIN


Past Surgical History:  Other


Additional Past Surgical Histo:  L CARPAL TUNNEL RELEASE


Smoking Status:  Current Every Day Smoker


Additional Information:  


0.5/PPD


Alcohol Use:  Occasionally


Drug Use:  Phencyclidine





General Adult


EDM:


Chief Complaint:  FATIGUE





HPI:


HPI:





Patient is a 45  year old female presents emergency department stating that she 

fears she might have HIV.  Patient states that ever since she had unprotected 

sex with a male approximately a week ago she has felt very faint and anxious.  

Patient states she came here to have a STI work-up, she was told she did not 

have any STDs.  Patient was sent home without treatment.  Patient states that 

she is back to get an HIV test.  Patient denies any symptoms other than her 

fears that make her feel anxious and faint.  Patient denies any syncopal 

episodes.  Patient denies any cough fever chills congestion chest pain shortness

of breath abdominal pains.  Patient denies any muscle aches, increased 

urination, vaginal discharge.  Patient denies any other physical complaints or 

physical illnesses.  Patient denies any COVID-19 symptoms and does not wish to 

be checked for the COVID-19 virus today.





Review of Systems:


Review of Systems:


Constitutional:   Denies fever or chills. 


Eyes:   Denies change in visual acuity. 


HENT:   Denies nasal congestion or sore throat.  


Respiratory:   Denies cough or shortness of breath.  


Cardiovascular:   Denies chest pain or edema.  


GI:   Denies abdominal pain, nausea, vomiting, bloody stools or diarrhea.  


:  Denies dysuria.  


Musculoskeletal:   Denies back pain or joint pain.  


Integument:   Denies rash.  


Neurologic:   Denies headache, focal weakness or sensory changes.  


Endocrine:   Denies polyuria or polydipsia.  


Lymphatic:  Denies swollen glands.  


Psychiatric:  Denies depression or anxiety.





Heart Score:


Risk Factors:


Risk Factors:  DM, Current or recent (<one month) smoker, HTN, HLP, family 

history of CAD, obesity.


Risk Scores:


Score 0 - 3:  2.5% MACE over next 6 weeks - Discharge Home


Score 4 - 6:  20.3% MACE over next 6 weeks - Admit for Clinical Observation


Score 7 - 10:  72.7% MACE over next 6 weeks - Early Invasive Strategies





Current Medications:


Patient reports she does not take any medications at home at this time.





Allergies:


Allergies:





Allergies








Coded Allergies Type Severity Reaction Last Updated Verified


 


  Penicillins Allergy Intermediate  10/2/20 No











Physical Exam:


PE:





Constitutional: Well developed, well nourished, no acute distress, non-toxic 

appearance. 


HENT: Normocephalic, atraumatic, bilateral external ears normal, oropharynx m

oist, no oral exudates, nose normal. 


Eyes: PERRLA, EOMI, conjunctiva normal, no discharge.  


Neck: Normal range of motion, no tenderness, supple, no stridor.  


Cardiovascular:Heart rate regular rhythm, no murmur 


Lungs & Thorax:  Bilateral breath sounds clear to auscultation 


Abdomen: Bowel sounds normal, soft, no tenderness, no masses, no pulsatile 

masses.  


Skin: Warm, dry, no erythema, no rash.  


Back: No tenderness, no CVA tenderness.  


Extremities: No tenderness, no cyanosis, no clubbing, ROM intact, no edema.  


Neurologic: Alert and oriented X 3, normal motor function, normal sensory 

function, no focal deficits noted. 


Psychologic: Affect normal, judgement normal, mood normal.





Current Patient Data:


Labs:





                                Laboratory Tests








Test


 12/1/20


19:40 12/1/20


19:48 12/1/20


20:00


 


Urine Collection Type Unknown    


 


Urine Color Yellow    


 


Urine Clarity Clear    


 


Urine pH


 6.0 (<5.0-8.0)


 


 





 


Urine Specific Gravity


 <=1.005


(1.000-1.030) 


 





 


Urine Protein


 Negative mg/dL


(NEG-TRACE) 


 





 


Urine Glucose (UA)


 Negative mg/dL


(NEG) 


 





 


Urine Ketones (Stick)


 Negative mg/dL


(NEG) 


 





 


Urine Blood Large (NEG)    


 


Urine Nitrite


 Negative (NEG)


 


 





 


Urine Bilirubin


 Negative (NEG)


 


 





 


Urine Urobilinogen Dipstick


 0.2 mg/dL (0.2


mg/dL) 


 





 


Urine Leukocyte Esterase


 Negative (NEG)


 


 





 


Urine RBC


 3-5 /HPF (0-2)


 


 





 


Urine WBC 0 /HPF (0-4)    


 


Urine Squamous Epithelial


Cells Few /LPF  


 


 





 


Urine Bacteria


 0 /HPF (0-FEW)


 


 





 


POC Urine HCG, Qualitative


 


 Hcg negative


(Negative) 





 


White Blood Count


 


 


 7.1 x10^3/uL


(4.0-11.0)


 


Red Blood Count


 


 


 4.31 x10^6/uL


(3.50-5.40)


 


Hemoglobin


 


 


 11.5 g/dL


(12.0-15.5)  L


 


Hematocrit


 


 


 34.7 %


(36.0-47.0)  L


 


Mean Corpuscular Volume


 


 


 81 fL ()





 


Mean Corpuscular Hemoglobin   27 pg (25-35)  


 


Mean Corpuscular Hemoglobin


Concent 


 


 33 g/dL


(31-37)


 


Red Cell Distribution Width


 


 


 17.3 %


(11.5-14.5)  H


 


Platelet Count


 


 


 336 x10^3/uL


(140-400)


 


Neutrophils (%) (Auto)   54 % (31-73)  


 


Lymphocytes (%) (Auto)   35 % (24-48)  


 


Monocytes (%) (Auto)   4 % (0-9)  


 


Eosinophils (%) (Auto)   5 % (0-3)  H


 


Basophils (%) (Auto)   2 % (0-3)  


 


Neutrophils # (Auto)


 


 


 3.8 x10^3/uL


(1.8-7.7)


 


Lymphocytes # (Auto)


 


 


 2.5 x10^3/uL


(1.0-4.8)


 


Monocytes # (Auto)


 


 


 0.3 x10^3/uL


(0.0-1.1)


 


Eosinophils # (Auto)


 


 


 0.3 x10^3/uL


(0.0-0.7)


 


Basophils # (Auto)


 


 


 0.1 x10^3/uL


(0.0-0.2)


 


Sodium Level


 


 


 141 mmol/L


(136-145)


 


Potassium Level


 


 


 3.5 mmol/L


(3.5-5.1)


 


Chloride Level


 


 


 104 mmol/L


()


 


Carbon Dioxide Level


 


 


 25 mmol/L


(21-32)


 


Anion Gap   12 (6-14)  


 


Blood Urea Nitrogen


 


 


 11 mg/dL


(7-20)


 


Creatinine


 


 


 0.9 mg/dL


(0.6-1.0)


 


Estimated GFR


(Cockcroft-Gault) 


 


 82.7  





 


BUN/Creatinine Ratio   12 (6-20)  


 


Glucose Level


 


 


 122 mg/dL


(70-99)  H


 


Calcium Level


 


 


 9.4 mg/dL


(8.5-10.1)


 


Total Bilirubin


 


 


 0.2 mg/dL


(0.2-1.0)


 


Aspartate Amino Transferase


(AST) 


 


 18 U/L (15-37)





 


Alanine Aminotransferase (ALT)


 


 


 19 U/L (14-59)





 


Alkaline Phosphatase


 


 


 111 U/L


()


 


Total Protein


 


 


 7.4 g/dL


(6.4-8.2)


 


Albumin


 


 


 3.5 g/dL


(3.4-5.0)


 


Albumin/Globulin Ratio


 


 


 0.9 (1.0-1.7)


L


 


Thyroid Stimulating Hormone


(TSH) 


 


 3.705 uIU/mL


(0.358-3.74)





                                Laboratory Tests


12/1/20 20:00








                                Laboratory Tests


12/1/20 20:00








Vital Signs:





                                   Vital Signs








  Date Time  Temp Pulse Resp B/P (MAP) Pulse Ox O2 Delivery O2 Flow Rate FiO2


 


12/1/20 19:45 98.1 87 20 118/60 (79) 99 Room Air  





 98.1       











EKG:


EKG:


[]





Radiology/Procedures:


Radiology/Procedures:


[]





Course & Med Decision Making:


Course & Med Decision Making


Pertinent Labs and Imaging studies reviewed. (See chart for details)





45-year-old female presents to the emergency department stating that she wants 

an HIV test.  States she was here back on 23 November and was checked for STDs 

and there were none found.  Patient states she was not treated for any STDs.  

Patient states that she fears that she might have HIV since having unprotected 

sex, she states this fear has increased her anxiety and makes her feel faint at 

times.  Patient denies any physical symptoms at this time, patient only demands 

to have an HIV test done.  Discussed with patient that HIV testing should be 

done at the health department so they can follow the results closely.  Also 

discussed with patient because she did complain of some faint feelings off and 

on since development of fear about HIV I would draw labs and run her urine to 

look for any infectious process that may be causing her symptoms.  The patient's

 urine was not infected, she was not pregnant, the patient's labs of CBC and CMP

 did not show any infectious process.  Discussed findings with patient, 

discussed with patient need to follow-up at health department for HIV testing or

 with her primary care doctor for HIV testing.  Patient gave verbal 

understanding of discharge instructions, return to ER concerns, patient had no 

further questions or concerns, patient discharged home without incident.





Diagnosis of feared condition not demonstrated, this is unlikely a domestic 

physical or emotional abuse, this is unlikely an infectious process.





Dragon Disclaimer:


Dragon Disclaimer:


This electronic medical record was generated, in whole or in part, using a voice

 recognition dictation system.





Departure


Departure


Impression:  


   Primary Impression:  


   Feared condition not demonstrated


Disposition:  01 DC HOME SELF CARE/HOMELESS


Condition:  GOOD


Referrals:  


UNKNOWN PCP NAME (PCP)





Additional Instructions:  


We have checked your laboratory levels they are all within normal limits, I 

agree with you that your symptoms seem to be related to your fear of sexually 

transmitted disease most specifically HIV.  Please follow-up with your primary 

care doctor or the health department to have an HIV test done soon.  Return to 

the emergency department for worsening symptoms or other concerns.  Please use 

condom barrier sex to prevent future sexually transmitted diseases.





EMERGENCY DEPARTMENT GENERAL DISCHARGE INSTRUCTIONS





Thank you for coming to  Emergency Department (ED) 

today and 


trusting us with you care.  We trust that you had a positive experience in our 

Emergency 


Department.  If you wish to speak to the department management, you may call the

 Director at 


[phone].





YOUR FOLLOW UP INSTRUCTIONS ARE AS FOLLOWS:





1.  Do you have a private Doctor?  If you do not have a private doctor, please 

ask for a 


resource list of physicians or clinics that may be able to assist you with 

follow up care.





2.  The Emergency Physicain has interpreted your x-rays.  The X-Ray specialist 

will also 


review them.  If there is a change in the findings, you will be notified in 48 

hours when at 


all possible.





3.  A lab test or culture has been done, your results will be reviewed and you 

will be 


notified if you need a change in treatment.





ADDITIONAL INSTRUCTIONS AND INFORMATION:





1.  Your care today has been supervised by a physician who is specially trained 

in emergency 


care.  Many problems require more than one evaluation for a complete diagnosis 

and 


treatment.  We recommend that you schedule your follow up appointment as 

recommended to 


ensure complete treatment of you illness or injury.  If you are unable to obtain

 follow up 


care and continue to have a problem, or if your condition worsens, we recommend 

that you 


return to the ED.





2.  We are not able to safely determine your condition over the phone nor are we

 able to 


give sound medical advice over the phone.  For these safety reasons, if you call

 for medical 


advice we will ask you to come to the ED for further evaluation.





3.  If you have any questions regarding these discharge instructions please call

 the ED at 


[phone].





SAFETY INFORMATION:





In the interest of safety, wellness, and injury prevention; we encourage you to 

wear your 


sealbelt, if you smoke; quite smoking, and we encourage family to use a 

protective helmet 


for bicycling and other sporting events that present an increased risk for head 

injury.





IF YOUR SYMPTOMS WORSEN OR NEW SYMPTOMS DEVELOP, OR YOU HAVE CONCERNS ABOUT YOUR

 CONDITION; 


OR IF YOUR CONDITION WORSENS WHILE YOU ARE WAITING FOR YOUR FOLLOW UP 

APPOINTMENT; EITHER 


CONTACT YOUR PRIMARY CARE DOCTOR, THE PHYSICIAN WHOSE NAME AND NUMBER YOU WERE 

GIVEN, OR 


RETURN TO THE ED IMMEDIATELY.











TASNEEM DAVIS        Dec 1, 2020 20:49

## 2021-12-03 ENCOUNTER — HOSPITAL ENCOUNTER (OUTPATIENT)
Dept: HOSPITAL 61 - SURG | Age: 46
Discharge: HOME | End: 2021-12-03
Attending: ORTHOPAEDIC SURGERY
Payer: COMMERCIAL

## 2021-12-03 VITALS — WEIGHT: 260.15 LBS | HEIGHT: 62 IN | BODY MASS INDEX: 47.87 KG/M2

## 2021-12-03 VITALS — SYSTOLIC BLOOD PRESSURE: 152 MMHG | DIASTOLIC BLOOD PRESSURE: 84 MMHG

## 2021-12-03 DIAGNOSIS — Z72.89: ICD-10-CM

## 2021-12-03 DIAGNOSIS — Z88.8: ICD-10-CM

## 2021-12-03 DIAGNOSIS — Z88.0: ICD-10-CM

## 2021-12-03 DIAGNOSIS — E66.9: ICD-10-CM

## 2021-12-03 DIAGNOSIS — Z98.890: ICD-10-CM

## 2021-12-03 DIAGNOSIS — G56.02: Primary | ICD-10-CM

## 2021-12-03 DIAGNOSIS — Z79.899: ICD-10-CM

## 2021-12-03 DIAGNOSIS — F17.210: ICD-10-CM

## 2021-12-03 DIAGNOSIS — F41.9: ICD-10-CM

## 2021-12-03 DIAGNOSIS — Z20.822: ICD-10-CM

## 2021-12-03 PROCEDURE — 81025 URINE PREGNANCY TEST: CPT

## 2021-12-03 PROCEDURE — 64721 CARPAL TUNNEL SURGERY: CPT

## 2021-12-03 PROCEDURE — A6452 HIGH COMPRES BAND W>=3"<5"YD: HCPCS

## 2021-12-03 PROCEDURE — A6402 STERILE GAUZE <= 16 SQ IN: HCPCS

## 2021-12-03 PROCEDURE — 87426 SARSCOV CORONAVIRUS AG IA: CPT

## 2021-12-03 PROCEDURE — A4930 STERILE, GLOVES PER PAIR: HCPCS

## 2021-12-03 PROCEDURE — A4657 SYRINGE W/WO NEEDLE: HCPCS

## 2021-12-03 PROCEDURE — 73090 X-RAY EXAM OF FOREARM: CPT

## 2021-12-03 RX ADMIN — FENTANYL CITRATE PRN MCG: 50 INJECTION INTRAMUSCULAR; INTRAVENOUS at 13:22

## 2021-12-03 RX ADMIN — MORPHINE SULFATE PRN MG: 2 INJECTION, SOLUTION INTRAMUSCULAR; INTRAVENOUS at 13:30

## 2021-12-03 RX ADMIN — FENTANYL CITRATE PRN MCG: 50 INJECTION INTRAMUSCULAR; INTRAVENOUS at 13:27

## 2021-12-03 RX ADMIN — MORPHINE SULFATE PRN MG: 2 INJECTION, SOLUTION INTRAMUSCULAR; INTRAVENOUS at 13:40

## 2021-12-03 NOTE — OP
DATE OF SURGERY: 12/03/2021

PREOPERATIVE DIAGNOSIS:  Chronic carpal tunnel syndrome, left.



POSTOPERATIVE DIAGNOSIS:  Chronic carpal tunnel syndrome, left.



PROCEDURE:  Transverse carpal ligament release, left.



SURGEON:  Alfredo Marroquin Jr, DO



FIRST ASSISTANT:  Rehan Christine.



ANESTHESIA:  General.



COMPLICATIONS:  None.



ESTIMATED BLOOD LOSS:  5 mL



DESCRIPTION OF PROCEDURE:  The patient was taken to the operative suite, given a

general anesthetic.  Left upper extremity was then prepped and draped in a 

sterile fashion.  After exsanguination, tourniquet was inflated to 250 mmHg, 

remained throughout the case.  The incision was directly over the area of the 

transverse carpal ligament in line with the radial border of the fourth digit.  

This was carefully taken down through skin and subcutaneous tissues as well as a

palmar fascia.  This brought us down to the transverse carpal ligament, which 

was easily identified.  The ligament was then fully released.  After this was 

released, the nerve was inspected and noted to be completely intact and 

released; therefore, there were no other issues or problems.  Therefore, this 

wound was then thoroughly irrigated.  This was dried, the skin was then 

reapproximated.  Local was placed in there within the wound site.  Sterile 

dressing was applied.  Tourniquet was deflated with good return of pulses and 

capillary refill.  The patient was then taken from the operative bed to the 

postoperative bed and taken to the PACU in stable condition.







ERUM

DR: Mack   DD: 12/03/2021 12:50

DT: 12/03/2021 13:09   TID: [id number]

## 2021-12-03 NOTE — PDOC4
OPERATIVE NOTE


Date:


Date:  Dec 3, 2021





Pre-Op Diagnosis:


Chronic carpal tunnel syndrome left





Post-Op Diagnosis:


Same





Procedure Performed:


Transverse carpal ligament release left





Surgeon:


Lopez





Anesthesia Type:


General





Blood Loss:


5 cc





Specimans Obtained:


None





Findings:


See dictation





Complications:


None











PIETRO MATHEW Jr., DO      Dec 3, 2021 12:50

## 2021-12-03 NOTE — DISCH
DISCHARGE INSTRUCTIONS


Condition on Discharge


Condition on Discharge:  Stable





Activity After Discharge


Activity Instructions for Disc:  Activity as tolerated, Avoid exertion


Lifting Instructions after Dis:  No pulling or pushing


Driving Instructions after Dis:  Do not drive today





Wound Incision Care


Wound/Incision Care:  Ice to area for comfort, Keep wound elevated, Change 

dressing


Other wound/incision instructi:  May change dressings postoperative day #3





Follow-Up


Follow up with:  10 to 14 days











PIETRO MATHEW Jr. DO      Dec 3, 2021 09:26

## 2021-12-03 NOTE — RAD
XR FOREARM_LEFT 2 VIEWS



History: Instrument count



Comparison: None available



Technique: AP view of the left hand and wrist



Findings/

impression: Osseous mineralization is normal. No fracture or dislocation. Subcutaneous air is present
 in the third intermetacarpal space, consistent with recent instrumentation. Question widening of the
 scapholunate interval. No radiopaque foreign body identified.



Electronically signed by: Rodriguez Clemens MD (12/3/2021 1:28 PM) [id number]